# Patient Record
Sex: FEMALE | Race: WHITE | URBAN - METROPOLITAN AREA
[De-identification: names, ages, dates, MRNs, and addresses within clinical notes are randomized per-mention and may not be internally consistent; named-entity substitution may affect disease eponyms.]

---

## 2018-07-23 ENCOUNTER — OFFICE VISIT (OUTPATIENT)
Dept: FAMILY MEDICINE CLINIC | Age: 78
End: 2018-07-23

## 2018-07-23 ENCOUNTER — HOSPITAL ENCOUNTER (OUTPATIENT)
Dept: LAB | Age: 78
Discharge: HOME | End: 2018-07-23

## 2018-07-23 VITALS
TEMPERATURE: 98.7 F | DIASTOLIC BLOOD PRESSURE: 101 MMHG | HEART RATE: 92 BPM | WEIGHT: 162 LBS | BODY MASS INDEX: 29.81 KG/M2 | SYSTOLIC BLOOD PRESSURE: 160 MMHG | HEIGHT: 62 IN

## 2018-07-23 DIAGNOSIS — I10 ESSENTIAL HYPERTENSION: ICD-10-CM

## 2018-07-23 DIAGNOSIS — E11.9 TYPE 2 DIABETES MELLITUS WITHOUT COMPLICATION, WITHOUT LONG-TERM CURRENT USE OF INSULIN (HCC): ICD-10-CM

## 2018-07-23 DIAGNOSIS — R31.9 URINARY TRACT INFECTION WITH HEMATURIA, SITE UNSPECIFIED: ICD-10-CM

## 2018-07-23 DIAGNOSIS — N39.0 URINARY TRACT INFECTION WITH HEMATURIA, SITE UNSPECIFIED: ICD-10-CM

## 2018-07-23 DIAGNOSIS — I10 ESSENTIAL HYPERTENSION: Primary | ICD-10-CM

## 2018-07-23 DIAGNOSIS — Z13.9 ENCOUNTER FOR SCREENING: ICD-10-CM

## 2018-07-23 LAB
BILIRUB UR QL STRIP: NORMAL
GLUCOSE POC: NORMAL MG/DL
GLUCOSE UR-MCNC: NEGATIVE MG/DL
KETONES P FAST UR STRIP-MCNC: NORMAL MG/DL
PH UR STRIP: 7 [PH] (ref 4.6–8)
PROT UR QL STRIP: NORMAL
SP GR UR STRIP: 1.01 (ref 1–1.03)
UA UROBILINOGEN AMB POC: NORMAL (ref 0.2–1)
URINALYSIS CLARITY POC: NORMAL
URINALYSIS COLOR POC: YELLOW
URINE BLOOD POC: NEGATIVE
URINE LEUKOCYTES POC: NORMAL
URINE NITRITES POC: POSITIVE

## 2018-07-23 PROCEDURE — 80053 COMPREHEN METABOLIC PANEL: CPT | Performed by: NURSE PRACTITIONER

## 2018-07-23 PROCEDURE — 85027 COMPLETE CBC AUTOMATED: CPT | Performed by: NURSE PRACTITIONER

## 2018-07-23 PROCEDURE — 84443 ASSAY THYROID STIM HORMONE: CPT | Performed by: NURSE PRACTITIONER

## 2018-07-23 PROCEDURE — 87077 CULTURE AEROBIC IDENTIFY: CPT | Performed by: NURSE PRACTITIONER

## 2018-07-23 PROCEDURE — 84439 ASSAY OF FREE THYROXINE: CPT | Performed by: NURSE PRACTITIONER

## 2018-07-23 PROCEDURE — 87086 URINE CULTURE/COLONY COUNT: CPT | Performed by: NURSE PRACTITIONER

## 2018-07-23 PROCEDURE — 83036 HEMOGLOBIN GLYCOSYLATED A1C: CPT | Performed by: NURSE PRACTITIONER

## 2018-07-23 PROCEDURE — 87186 SC STD MICRODIL/AGAR DIL: CPT | Performed by: NURSE PRACTITIONER

## 2018-07-23 RX ORDER — CEPHALEXIN 500 MG/1
500 CAPSULE ORAL 2 TIMES DAILY
Qty: 14 CAP | Refills: 0 | Status: SHIPPED | OUTPATIENT
Start: 2018-07-23 | End: 2018-07-30

## 2018-07-23 RX ORDER — METFORMIN HYDROCHLORIDE 1000 MG/1
1000 TABLET ORAL DAILY
Qty: 90 TAB | Refills: 1 | Status: SHIPPED | OUTPATIENT
Start: 2018-07-23 | End: 2018-08-27 | Stop reason: SDUPTHER

## 2018-07-23 NOTE — PATIENT INSTRUCTIONS
Diretrizes sobre alimentos para diabéticos - [ Learning About Diabetes Food Guidelines ]  Instruções sobre seus [address] refeição é importante no controle do diabetes. Miroslava ajuda a manter o açúcar do sangue no nível desejado (determinado pelo seu médico). Você não precisa comer alimentos especiais. Pode comer o mesmo que toda a família, incluindo doces de [employer]. Mas precisa prestar atenção à frequência com que você come e o quanto come de determinados alimentos. Pode ser interessante contatar um dietista ou um educador certificado em diabetes (CDE, na sigla em inglês) para ajudá-lo a planejar as refeições e lanches. Um dietista ou CDE também pode ajudá-lo a perder peso, se essa for [employer]. O que você deve saber sobre comer carboidratos? O controle da quantidade de carboidratos (carbs) que você come é marbin parte importante de refeições saudáveis quando você tem diabetes. O carboidrato encontra-se em muitos alimentos. · Saiba quais são os alimentos que têm carboidratos. E saiba quais são as quantidades de carbs no diferentes alimentos. ¨ Pão, cereais, massas e arroz têm cerca de 15 gramas de carbs por porção. Marbin porção equivale a marbin fatia de pão (1 onça), 1/2 xícara de cereal cozido ou 13 de xícara de massa ou arroz cozidos. ¨ As frutas têm 15 gramas de carbs por porção. Marbin porção equivale a marbin fruta fresca pequena, reji marbin maçã ou laranja; 1/2 banana; 1/2 xícara de fruta cozida ou enlatada; 1/2 xícara de suco de fruta; 1 xícara de melão ou frutas silvestres (raspberries); ou 2 colheres de sopa de frutas secas. ¨ Mimi e iogurte bethany adição de açúcar têm 15 gramas de carbs em marbin porção. Marbin porção equivale a um copo de [employer] a 2/3 de [place] de iogurte bethany adição de [id number]. ¨ Os legumes com amido têm 15 gramas de carbs por porção.  Marbin porção equivale a 1/2 [place] de purê de batata ou de batata doce; 1 xícara de abóbora (winter squash); 1/2 de marbin batata assada pequena; 1/2 de marbin xícara de feijão cozido; ou 1/2 xícara de milho ou ervilhas cozidas. · Saiba quantos carboidratos comer a cada chio, em cada refeição. Um dietista ou educador certificado em diabetes (CDE, na sigla em inglês) pode ensinar a você reji controlar a quantidade de carboidratos que você come. Isso é chamado de contagem de carboidratos. · Se não tiver certeza de reji contar gramas de carboidrato, use o método do prato para planejar as refeições. É marbin forma boa e rápida de garantir que você terá marbin refeição bem equilibrada. Ajuda também a distribuir os carboidratos tenzin o chio. ¨ Divida o prato pelos tipos de alimentos. Coloque os legumes bethany amido em marbin metade do prato, carne ou outro alimento com proteína em um quarto do prato, e um grão ou legume com amido no outro quarto do prato. Você pode ainda acrescentar marbin fruta pequena e um copo de mor ou de iogurte, dependendo de quantos carboidratos você ind[place] comer por refeição. · Tente comer a mesma quantidade de carboidratos em toda refeição. Não \"economize\" sua porção diária de carbs de marbin refeição. · As proteínas têm pouco ou nenhum carboidrato por porção. Exemplos de proteínas são carne de pascual, frango, peru, peixe, ovos, tofu, queijo, queijo cottage e pasta de amendoim. Marbin porção de carne tem 3 onças, que equivale ao tamanho de um baralho de cartas. Exemplos de tamanhos de porções que substituem a carne (igual a marbin onça de carne) são 1/4 de xícara de queijo cottage, 1 ovo, 1 colher de sopa de pasta de amendoim e 1/2 xícara de tofu. [place] sandy ir a um restaurante e ainda me alimentar de forma saudável? · Aprenda a estimar o [employer] alimentos que contêm carboidratos. Se você medir os alimentos em casa, será mais fácil estimar a quantidade de marbin porção servida no restaurante.   · Se o prato que você pedir tiver muitos carboidratos (reji batata, milho ou feijão tipo baked beans), peça em vez um prato com baixo teor de carboidratos. Peça marbin salada ou verduras verdes. · Se usar insulina, meça o teor de açúcar no sangue antes e depois de comer fora, para ajudá-lo a planejar quanto comer no futuro. · Se comer mais carboidratos tenzin marbin refeição do que planejava, faça marbin caminhada ou outro exercício. Isso ajuda a baixar o teor de açúcar no sangue. O que mais você deveria saber? · Limite a Philmore Roers, reji gordura de produtos de carne e laticínios. Essa é marbin escolha saudável pois as pessoas diabéticas correm maior risco de terem doença cardíaca. Escolha paula magras e produtos bethany [facility] ou com baixo teor de [facility]. Cozinhe com azeite de [address] ou [facility] em vez de Cite Bouslama ou banha. · Não deixe de fazer marbin refeição. O teor de açúcar no sangue pode ficar muito baixo se você pular refeições e josie insulina ou determinados remédios para diabetes. · Verifique com o seu médico antes de beber Willamina Cleaves. O álcool pode fazer com que o teor de açúcar no sangue fique baixo demais. O álcool também pode causar marbin reação se você josie determinados medicamentos para diabetes. O acompanhamento do cuidado médico é parte importante do seu tratamento e Joel Goel. Não deixe de marcar e ir a todas as consultas, e ligar para seu médico se estiver sentindo problemas. Também é marbin boa ideia saber o resultado dos exames e manter marbin lista dos medicamentos que está tomando. Onde você pode aprender mais? Acesse http://www.woods.com/. Digite o I147 Na caixa de busca para saber mais sobre \"Diretrizes sobre alimentos para diabéticos - [ Learning About Diabetes Food Guidelines ]. \"  Na data de: 7 dezembro, 2017  Versão de conteúdo: 11.7  © 2515-8611 Healthwise, Incorporated. Instruções de cuidados adaptadas sob licença de seu profissional da saúde. Se você tem dúvidas sobre um estado clínico ou essas instruções, sempre pergunte ao seu profissional da saúde.  A Healthwise, Incorporated renúncia a toda e qualquer garantia ou responsabilidade por seu uso dessas informações.

## 2018-07-23 NOTE — PROGRESS NOTES
Results for orders placed or performed in visit on 07/23/18   AMB POC GLUCOSE BLOOD, BY GLUCOSE MONITORING DEVICE   Result Value Ref Range    Glucose POC nf 203 mg/dL   AMB POC URINALYSIS DIP STICK MANUAL W/O MICRO   Result Value Ref Range    Color (UA POC) Yellow     Clarity (UA POC) Cloudy     Glucose (UA POC) Negative Negative    Bilirubin (UA POC) 2+ Negative    Ketones (UA POC) 2+ Negative    Specific gravity (UA POC) 1.010 1.001 - 1.035    Blood (UA POC) Negative Negative    pH (UA POC) 7.0 4.6 - 8.0    Protein (UA POC) 1+ Negative    Urobilinogen (UA POC) 0.2 mg/dL 0.2 - 1    Nitrites (UA POC) Positive Negative    Leukocyte esterase (UA POC) 2+ Negative

## 2018-07-23 NOTE — PROGRESS NOTES
Patient speak english/Guyanese, declined for nurse to use Guyanese [employer] . Patient states that she has difficulty understanding mostly numbers due to a hx of stroke. Reviewed AVS, prescription and pharmacy location with patient. AVS printed and given. Vision resource sheet given. Discuss with patient importance of bringing her medication list for her next appt. With the provider. Directed patient to registration to make RD appt. And 6 weeks appt. With provider for f/u. Patient verbalized understanding . No questions or concern from patient at this time.  Reji Li RN

## 2018-07-23 NOTE — PROGRESS NOTES
Subjective:     Chief Complaint   Patient presents with    Medication Refill     BP and Diabetes    Other     constant urination        She  is a 70 y.o. female who presents for evaluation of urinary frequency and blurry vision. Onset of urinary frequency has been \"years\" and c/o of dysuria x 3-4 weeks ago. Has been dx'd with cataracts, LOV w/ optometry in June in [facility] and was recommended to have surg then. Has been in [place] x 1 month.     prior Hx of DM nor HTN. PMH: DM 2, HTN and CVA (ischemic)    Surg:  hysterectomy     Iodine provokes rash, no other Rx allergies     Current Rx/supplements: Unknown HTN Rx, metformin 1g daily in AM.     Social:  Pt denies tobacco, etoh nor drug use. Pt recently moved her from [facility] w/ her family. ROS  Gen - no fever/chills  Resp - no dyspnea or cough  CV - no chest pain or SERRANO  Rest per HPI    No past medical history on file. No past surgical history on file. No current outpatient prescriptions on file prior to visit. No current facility-administered medications on file prior to visit.          Objective:     Vitals:    07/23/18 1357 07/23/18 1402   BP: (!) 169/99 (!) 160/101   Pulse: 91 92   Temp:  98.7 °F (37.1 °C)   TempSrc:  Oral   Weight: 162 lb (73.5 kg)    Height: 5' 2.21\" (1.58 m)        Physical Examination:  General appearance - alert, well appearing, and in no distress  Eyes -sclera anicteric  Neck - supple, no significant adenopathy, no thyromegaly  Chest - clear to auscultation, no wheezes, rales or rhonchi, symmetric air entry  Heart - normal rate, regular rhythm, normal S1, S2, no murmurs, rubs, clicks or gallops  Neurological - alert, oriented, no focal findings or movement disorder noted  Abdomen-BS present/WNL x 4 quads, non-tender/distended, soft,no organomegaly    Recent Results (from the past 12 hour(s))   AMB POC GLUCOSE BLOOD, BY GLUCOSE MONITORING DEVICE    Collection Time: 07/23/18  2:08 PM   Result Value Ref Range Glucose POC nf 203 mg/dL   AMB POC URINALYSIS DIP STICK MANUAL W/O MICRO    Collection Time: 07/23/18  2:22 PM   Result Value Ref Range    Color (UA POC) Yellow     Clarity (UA POC) Cloudy     Glucose (UA POC) Negative Negative    Bilirubin (UA POC) 2+ Negative    Ketones (UA POC) 2+ Negative    Specific gravity (UA POC) 1.010 1.001 - 1.035    Blood (UA POC) Negative Negative    pH (UA POC) 7.0 4.6 - 8.0    Protein (UA POC) 1+ Negative    Urobilinogen (UA POC) 0.2 mg/dL 0.2 - 1    Nitrites (UA POC) Positive Negative    Leukocyte esterase (UA POC) 2+ Negative         Assessment/ Plan:   Diagnoses and all orders for this visit:    1. Essential hypertension  -     CBC W/O DIFF; Future  -     METABOLIC PANEL, COMPREHENSIVE; Future  -     HEMOGLOBIN A1C WITH EAG; Future  -     TSH 3RD GENERATION; Future  -     T4, FREE; Future    2. Encounter for screening  -     AMB POC GLUCOSE BLOOD, BY GLUCOSE MONITORING DEVICE  -     AMB POC URINALYSIS DIP STICK MANUAL W/O MICRO    3. Type 2 diabetes mellitus without complication, without long-term current use of insulin (HCC)  -     CBC W/O DIFF; Future  -     METABOLIC PANEL, COMPREHENSIVE; Future  -     HEMOGLOBIN A1C WITH EAG; Future  -     TSH 3RD GENERATION; Future  -     T4, FREE; Future  -     metFORMIN (GLUCOPHAGE) 1,000 mg tablet; Take 1 Tab by mouth daily. 4. Urinary tract infection with hematuria, site unspecified  -     CULTURE, URINE; Future  -     cephALEXin (KEFLEX) 500 mg capsule; Take 1 Cap by mouth two (2) times a day for 7 days. Advised to bring HTN Rx by [facility] site to be added to her list.     ? Glucose control, check baseline labs and refill metformin at current dose. Send off urine Cx, ? keytones from UTI vs DM. Start Keflex BID x 1 week while Cx pending. RTC in 6 weeks. Change POC PRN based on Cx or A1C results. Refer to local vision clinic, discussed AN reqs re: 6 months in area, so Pt will be eligible in Dec later this year. I have discussed the diagnosis with the patient and the intended plan as seen in the above orders. The patient has received an after-visit summary and questions were answered concerning future plans. I have discussed medication side effects and warnings with the patient as well. The patient verbalizes understanding and agreement with the plan.     Follow-up Disposition: Not on File

## 2018-07-24 LAB
ALBUMIN SERPL-MCNC: 4 G/DL (ref 3.5–5)
ALBUMIN/GLOB SERPL: 1.2 {RATIO} (ref 1.1–2.2)
ALP SERPL-CCNC: 54 U/L (ref 45–117)
ALT SERPL-CCNC: 20 U/L (ref 12–78)
ANION GAP SERPL CALC-SCNC: 7 MMOL/L (ref 5–15)
AST SERPL-CCNC: 16 U/L (ref 15–37)
BILIRUB SERPL-MCNC: 0.5 MG/DL (ref 0.2–1)
BUN SERPL-MCNC: 23 MG/DL (ref 6–20)
BUN/CREAT SERPL: 23 (ref 12–20)
CALCIUM SERPL-MCNC: 9.8 MG/DL (ref 8.5–10.1)
CHLORIDE SERPL-SCNC: 106 MMOL/L (ref 97–108)
CO2 SERPL-SCNC: 29 MMOL/L (ref 21–32)
CREAT SERPL-MCNC: 1.02 MG/DL (ref 0.55–1.02)
EST. AVERAGE GLUCOSE BLD GHB EST-MCNC: 151 MG/DL
GLOBULIN SER CALC-MCNC: 3.4 G/DL (ref 2–4)
GLUCOSE SERPL-MCNC: 149 MG/DL (ref 65–100)
HBA1C MFR BLD: 6.9 % (ref 4.2–6.3)
POTASSIUM SERPL-SCNC: 4.4 MMOL/L (ref 3.5–5.1)
PROT SERPL-MCNC: 7.4 G/DL (ref 6.4–8.2)
SODIUM SERPL-SCNC: 142 MMOL/L (ref 136–145)

## 2018-07-25 LAB
ERYTHROCYTE [DISTWIDTH] IN BLOOD BY AUTOMATED COUNT: 13.1 % (ref 11.5–14.5)
HCT VFR BLD AUTO: 50.3 % (ref 35–47)
HGB BLD-MCNC: 15.1 G/DL (ref 11.5–16)
MCH RBC QN AUTO: 28.5 PG (ref 26–34)
MCHC RBC AUTO-ENTMCNC: 30 G/DL (ref 30–36.5)
MCV RBC AUTO: 94.9 FL (ref 80–99)
NRBC # BLD: 0 K/UL (ref 0–0.01)
NRBC BLD-RTO: 0 PER 100 WBC
PLATELET # BLD AUTO: 191 K/UL (ref 150–400)
RBC # BLD AUTO: 5.3 M/UL (ref 3.8–5.2)
T4 FREE SERPL-MCNC: 1.1 NG/DL (ref 0.8–1.5)
TSH SERPL DL<=0.05 MIU/L-ACNC: 1.58 UIU/ML (ref 0.36–3.74)
WBC # BLD AUTO: 7.8 K/UL (ref 3.6–11)

## 2018-07-25 NOTE — PROGRESS NOTES
Labs as expected, renal funct stable, DM in good control, Pt to bring HTN Rx to clinic. C&S pending.

## 2018-07-26 LAB
BACTERIA SPEC CULT: ABNORMAL
CC UR VC: ABNORMAL
SERVICE CMNT-IMP: ABNORMAL

## 2018-08-27 ENCOUNTER — OFFICE VISIT (OUTPATIENT)
Dept: FAMILY MEDICINE CLINIC | Age: 78
End: 2018-08-27

## 2018-08-27 VITALS
SYSTOLIC BLOOD PRESSURE: 134 MMHG | TEMPERATURE: 98.6 F | BODY MASS INDEX: 30.16 KG/M2 | WEIGHT: 166 LBS | HEART RATE: 87 BPM | DIASTOLIC BLOOD PRESSURE: 91 MMHG

## 2018-08-27 DIAGNOSIS — I10 ESSENTIAL HYPERTENSION: ICD-10-CM

## 2018-08-27 DIAGNOSIS — N39.0 URINARY TRACT INFECTION WITHOUT HEMATURIA, SITE UNSPECIFIED: ICD-10-CM

## 2018-08-27 DIAGNOSIS — E11.9 TYPE 2 DIABETES MELLITUS WITHOUT COMPLICATION, WITHOUT LONG-TERM CURRENT USE OF INSULIN (HCC): Primary | ICD-10-CM

## 2018-08-27 LAB
BACTERIA UA POCT, BACTPOCT: NORMAL
BILIRUB UR QL STRIP: NEGATIVE
CASTS UA POCT: NORMAL
CLUE CELLS, CLUEPOCT: NORMAL
CRYSTALS UA POCT, CRYSPOCT: NORMAL
EPITHELIAL CELLS POCT: NORMAL
GLUCOSE POC: NORMAL MG/DL
GLUCOSE UR-MCNC: NEGATIVE MG/DL
KETONES P FAST UR STRIP-MCNC: NORMAL MG/DL
MUCUS UA POCT, MUCPOCT: NORMAL
PH UR STRIP: 5 [PH] (ref 4.6–8)
PROT UR QL STRIP: NEGATIVE
RBC UA POCT, RBCPOCT: NORMAL
SP GR UR STRIP: 1.02 (ref 1–1.03)
TRICH UA POCT, TRICHPOC: NORMAL
UA UROBILINOGEN AMB POC: NORMAL (ref 0.2–1)
URINALYSIS CLARITY POC: CLEAR
URINALYSIS COLOR POC: YELLOW
URINE BLOOD POC: NEGATIVE
URINE CULT COMMENT, POCT: NORMAL
URINE LEUKOCYTES POC: NEGATIVE
URINE NITRITES POC: NEGATIVE
WBC UA POCT, WBCPOCT: NORMAL
YEAST UA POCT, YEASTPOC: NORMAL

## 2018-08-27 RX ORDER — LISINOPRIL 5 MG/1
5 TABLET ORAL DAILY
Qty: 90 TAB | Refills: 3 | Status: SHIPPED | OUTPATIENT
Start: 2018-08-27

## 2018-08-27 RX ORDER — METFORMIN HYDROCHLORIDE 1000 MG/1
1000 TABLET ORAL DAILY
Qty: 90 TAB | Refills: 3 | Status: SHIPPED | OUTPATIENT
Start: 2018-08-27

## 2018-08-27 RX ORDER — LISINOPRIL 40 MG/1
40 TABLET ORAL DAILY
Qty: 90 TAB | Refills: 3 | Status: SHIPPED | OUTPATIENT
Start: 2018-08-27 | End: 2018-08-27

## 2018-08-27 RX ORDER — LISINOPRIL 5 MG/1
5 TABLET ORAL DAILY
Qty: 90 TAB | Refills: 3 | Status: SHIPPED | OUTPATIENT
Start: 2018-08-27 | End: 2018-08-27

## 2018-08-27 NOTE — PROGRESS NOTES
Coordination of Care  1. Have you been to the ER, urgent care clinic since your last visit? Hospitalized since your last visit? No    2. Have you seen or consulted any other health care providers outside of the [facility] since your last visit? Include any pap smears or colon screening. No    Does the patient need refills? YES    Learning Assessment Complete?  yes  Depression Screening complete in the past 12 months? yes     Results for orders placed or performed in visit on 08/27/18   AMB POC GLUCOSE BLOOD, BY GLUCOSE MONITORING DEVICE   Result Value Ref Range    Glucose POC nf 137 mg/dL   AMB POC URINALYSIS DIP STICK AUTO W/ MICRO    Result Value Ref Range    Color (UA POC) Yellow     Clarity (UA POC) Clear     Glucose (UA POC) Negative Negative    Bilirubin (UA POC) Negative Negative    Ketones (UA POC) 1+ Negative    Specific gravity (UA POC) 1.025 1.001 - 1.035    Blood (UA POC) Negative Negative    pH (UA POC) 5.0 4.6 - 8.0    Protein (UA POC) Negative Negative    Urobilinogen (UA POC) 0.2 mg/dL 0.2 - 1    Nitrites (UA POC) Negative Negative    Leukocyte esterase (UA POC) Negative Negative    Epithelial cells (UA POC)      Mucus (UA POC)      WBCs (UA POC)      RBCs (UA POC)      Casts (UA POC)  Negative    Crystals (UA POC)  Negative    Clue Cells (UA POC)      Trichomonas (UA POC)      Yeast (UA POC)      Bacteria (UA POC)  Negative    URINE CULT COMMENT (UA POC)

## 2018-08-27 NOTE — MR AVS SNAPSHOT
[address] [address] [address] [place] [address] 
[phone] Patient: [facility] MRN: [id number]:[id number] Visit Information Date & Time Provider Department Dept. Phone Encounter #  
 8/27/2018  2:45 PM Rosa Baker MD [facility] [phone] [id number] Upcoming Health Maintenance Date Due DTaP/Tdap/Td series (1 - Tdap) 7/2/1963 ZOSTER VACCINE AGE 60> 5/2/2002 GLAUCOMA SCREENING Q2Y 7/2/2007 Bone Densitometry (Dexa) Screening 7/2/2007 Pneumococcal 65+ Low/Medium Risk (1 of 2 - PCV13) 7/2/2007 Influenza Age 5 to Adult 8/1/2018 Allergies as of 8/27/2018  Review Complete On: 8/27/2018 By: Rosa Baker MD  
  
 Severity Noted Reaction Type Reactions Iodine And Iodide Containing Products  07/23/2018    Rash Current Immunizations  Never Reviewed No immunizations on file. Not reviewed this visit You Were Diagnosed With   
  
 Codes Comments Type 2 diabetes mellitus without complication, without long-term current use of insulin (HCC)    -  Primary ICD-10-CM: E11.9 ICD-9-CM: 250.00 Urinary tract infection without hematuria, site unspecified     ICD-10-CM: N39.0 ICD-9-CM: 599.0 Essential hypertension     ICD-10-CM: I10 
ICD-9-CM: 401.9 Vitals BP Pulse Temp Weight(growth percentile) BMI OB Status (!) 134/91 (BP 1 Location: Right arm) 87 98.6 °F (37 °C) (Oral) 166 lb (75.3 kg) 30.16 kg/m2 Postmenopausal  
 Smoking Status Never Smoker Vitals History BMI and BSA Data Body Mass Index Body Surface Area  
 30.16 kg/m 2 1.82 m 2 Preferred Pharmacy Pharmacy Name Phone [facility] [address] [facility], [address] [facility] Floor [phone] Your Updated Medication List  
  
   
This list is accurate as of 8/27/18  3:43 PM.  Always use your most recent med list.  
  
  
  
  
 lisinopril 5 mg tablet Commonly known as:  Arabella Lights Take 1 Tab by mouth daily. metFORMIN 1,000 mg tablet Commonly known as:  GLUCOPHAGE Take 1 Tab by mouth daily. Prescriptions Sent to Pharmacy Refills  
 metFORMIN (GLUCOPHAGE) 1,000 mg tablet 3 Sig: Take 1 Tab by mouth daily. Class: Normal  
 [address]acy: [address] [facility], [address] [facility] Ph #: [phone] Route: Oral  
 lisinopril (PRINIVIL, ZESTRIL) 5 mg tablet 3 Sig: Take 1 Tab by mouth daily. Class: Normal  
 Pharmacy: [address]  [address] [facility], [address] [facility] Ph #: [phone] Route: Oral  
  
We Performed the Following AMB POC GLUCOSE BLOOD, BY GLUCOSE MONITORING DEVICE [20857 CPT(R)] AMB POC URINALYSIS DIP STICK AUTO W/ MICRO  [27231 CPT(R)] Patient Instructions Low Sodium Diet (2,000 Milligram): Care Instructions Your Care Instructions Too much sodium causes your body to hold on to extra water. This can raise your blood pressure and force your heart and kidneys to work harder. In very serious cases, this could cause you to be put in the hospital. It might even be life-threatening. By limiting sodium, you will feel better and lower your risk of serious problems. The most common source of sodium is salt. People get most of the salt in their diet from canned, prepared, and packaged foods. Fast food and restaurant meals also are very high in sodium. Your doctor will probably limit your sodium to less than 2,000 milligrams (mg) a day. This limit counts all the sodium in prepared and packaged foods and any salt you add to your food. Follow-up care is a key part of your treatment and safety. Be sure to make and go to all appointments, and call your doctor if you are having problems. It's also a good idea to know your test results and keep a list of the medicines you take. How can you care for yourself at home? Read food labels · Read labels on cans and food packages. The labels tell you how much sodium is in each serving. Make sure that you look at the serving size. If you eat more than the serving size, you have eaten more sodium. · Food labels also tell you the Percent Daily Value for sodium. Choose products with low Percent Daily Values for sodium. · Be aware that sodium can come in forms other than salt, including monosodium glutamate (MSG), sodium citrate, and sodium bicarbonate (baking soda). MSG is often added to Asian food. When you eat out, you can sometimes ask for food without MSG or added salt. Buy low-sodium foods · Buy foods that are labeled \"unsalted\" (no salt added), \"sodium-free\" (less than 5 mg of sodium per serving), or \"low-sodium\" (less than 140 mg of sodium per serving). Foods labeled \"reduced-sodium\" and \"light sodium\" may still have too much sodium. Be sure to read the label to see how much sodium you are getting. · Buy fresh vegetables, or frozen vegetables without added sauces. Buy low-sodium versions of canned vegetables, soups, and other canned goods. Prepare low-sodium meals · Cut back on the amount of salt you use in cooking. This will help you adjust to the taste. Do not add salt after cooking. One teaspoon of salt has about 2,300 mg of sodium. · Take the salt shaker off the table. · Flavor your food with garlic, lemon juice, onion, vinegar, herbs, and spices. Do not use soy sauce, lite soy sauce, steak sauce, onion salt, garlic salt, celery salt, mustard, or ketchup on your food. · Use low-sodium salad dressings, sauces, and ketchup. Or make your own salad dressings and sauces without adding salt. · Use less salt (or none) when recipes call for it. You can often use half the salt a recipe calls for without losing flavor. Other foods such as rice, pasta, and grains do not need added salt. · Rinse canned vegetables, and cook them in fresh water.  This removes some-but not all-of the salt. · Avoid water that is naturally high in sodium or that has been treated with water softeners, which add sodium. Call your local water company to find out the sodium content of your water supply. If you buy bottled water, read the label and choose a sodium-free brand. Avoid high-sodium foods · Avoid eating: ¨ Smoked, cured, salted, and canned meat, fish, and poultry. ¨ Ham, martinez, hot dogs, and luncheon meats. ¨ Regular, hard, and processed cheese and regular peanut butter. ¨ Crackers with salted tops, and other salted snack foods such as pretzels, chips, and salted popcorn. ¨ Frozen prepared meals, unless labeled low-sodium. ¨ Canned and dried soups, broths, and bouillon, unless labeled sodium-free or low-sodium. ¨ Canned vegetables, unless labeled sodium-free or low-sodium. ¨ Western Sandra fries, pizza, tacos, and other fast foods. ¨ Pickles, olives, ketchup, and other condiments, especially soy sauce, unless labeled sodium-free or low-sodium. Where can you learn more? Go to http://dora-niecy.info/. Enter T403 in the search box to learn more about \"Low Sodium Diet (2,000 Milligram): Care Instructions. \" Current as of: May 12, 2017 Content Version: 11.7 © 0315-9437 [employer]. Care instructions adapted under license by [employer] (which disclaims liability or warranty for this information). If you have questions about a medical condition or this instruction, always ask your healthcare professional. [address] [address] any warranty or liability for your use of this information. Introducing [facility]! [employer] introduces Zomazz patient portal. Now you can access parts of your medical record, email your doctor's office, and request medication refills online. 1. In your internet browser, go to [email or web address] 2. Click on the First Time User? Click Here link in the Sign In box. You will see the New Member Sign Up page. 3. Enter your [email or web address] Access Code exactly as it appears below. You will not need to use this code after youve completed the sign-up process. If you do not sign up before the expiration date, you must request a new code. · [email or web address] Access Code: [email or web address] Expires: 11/25/2018  3:43 PM 
 
4. Enter the last four digits of your Social Security Number (xxxx) and Date of Birth (mm/dd/yyyy) as indicated and click Submit. You will be taken to the next sign-up page. 5. Create a [email or web address] ID. This will be your [email or web address] login ID and cannot be changed, so think of one that is secure and easy to remember. 6. Create a [email or web address] password. You can change your password at any time. 7. Enter your Password Reset Question and Answer. This can be used at a later time if you forget your password. 8. Enter your e-mail address. You will receive e-mail notification when new information is available in [address] 9. Click Sign Up. You can now view and download portions of your medical record. 10. Click the Download Summary menu link to download a portable copy of your medical information. If you have questions, please visit the Frequently Asked Questions section of the [email or web address] website. Remember, [email or web address] is NOT to be used for urgent needs. For medical emergencies, dial 911. Now available from your iPhone and Android! Please provide this summary of care documentation to your next provider. If you have any questions after today's visit, please call [phone].

## 2018-08-27 NOTE — PATIENT INSTRUCTIONS

## 2018-08-27 NOTE — PROGRESS NOTES
HISTORY OF PRESENT ILLNESS  Colby Rondon is a 70 y.o. female. HPI  Patient is feeling well. She was not eating well and was eating very salty and her blood pressure went up to 192/110. She changed her diet and her blood ptressure is getting better but still gets an ocassional reading that is not well controlled. Has changed medications too. Patient is concerned because she was told she had an infection, took cephalexin and seems to be better from the UTI. No other concerns  Review of Systems   Constitutional: Negative for chills, fever, malaise/fatigue and weight loss. Respiratory: Negative for cough, sputum production, shortness of breath and wheezing. Cardiovascular: Negative for chest pain, palpitations and orthopnea. Gastrointestinal: Negative for abdominal pain, diarrhea, heartburn, nausea and vomiting. BP (!) 134/91 (BP 1 Location: Right arm)  Pulse 87  Temp 98.6 °F (37 °C) (Oral)   Wt 166 lb (75.3 kg)  BMI 30.16 kg/m2  Physical Exam   Constitutional: She is oriented to person, place, and time. She appears well-developed and well-nourished. HENT:   Head: Normocephalic. Eyes: Conjunctivae and EOM are normal. Pupils are equal, round, and reactive to light. Neck: Normal range of motion. Neck supple. No thyromegaly present. Cardiovascular: Normal rate, regular rhythm and normal heart sounds. No murmur heard. Pulmonary/Chest: Effort normal and breath sounds normal. No respiratory distress. She has no wheezes. She has no rales. She exhibits no tenderness. Abdominal: Soft. Bowel sounds are normal. She exhibits no distension. There is no tenderness. There is no rebound and no guarding. Musculoskeletal: Normal range of motion. She exhibits no edema, tenderness or deformity. Neurological: She is alert and oriented to person, place, and time. She has normal reflexes. Skin: Skin is warm. No rash noted.        ASSESSMENT and PLAN  Diagnoses and all orders for this visit: 1. Type 2 diabetes mellitus without complication, without long-term current use of insulin (HCC)  -     AMB POC GLUCOSE BLOOD, BY GLUCOSE MONITORING DEVICE  -     metFORMIN (GLUCOPHAGE) 1,000 mg tablet; Take 1 Tab by mouth daily. 2. Urinary tract infection without hematuria, site unspecified  -     AMB POC URINALYSIS DIP STICK AUTO W/ MICRO     3. Essential hypertension  -     lisinopril (PRINIVIL, ZESTRIL) 5 mg tablet; Take 1 Tab by mouth daily.     return to office in 2 months for follow up and blood test